# Patient Record
Sex: FEMALE | Race: WHITE | NOT HISPANIC OR LATINO | Employment: OTHER | URBAN - METROPOLITAN AREA
[De-identification: names, ages, dates, MRNs, and addresses within clinical notes are randomized per-mention and may not be internally consistent; named-entity substitution may affect disease eponyms.]

---

## 2021-01-14 DIAGNOSIS — Z23 NEED FOR VACCINATION: ICD-10-CM

## 2022-01-28 ENCOUNTER — APPOINTMENT (OUTPATIENT)
Dept: RADIOLOGY | Facility: REHABILITATION | Age: 77
End: 2022-01-28
Attending: PHYSICAL MEDICINE & REHABILITATION
Payer: MEDICARE

## 2022-01-28 ENCOUNTER — HOSPITAL ENCOUNTER (OUTPATIENT)
Facility: REHABILITATION | Age: 77
End: 2022-01-28
Attending: PHYSICAL MEDICINE & REHABILITATION | Admitting: PHYSICAL MEDICINE & REHABILITATION
Payer: MEDICARE

## 2022-01-28 VITALS
SYSTOLIC BLOOD PRESSURE: 153 MMHG | TEMPERATURE: 97.7 F | OXYGEN SATURATION: 99 % | WEIGHT: 112.43 LBS | HEIGHT: 60 IN | DIASTOLIC BLOOD PRESSURE: 91 MMHG | HEART RATE: 96 BPM | RESPIRATION RATE: 16 BRPM | BODY MASS INDEX: 22.07 KG/M2

## 2022-01-28 PROCEDURE — 700101 HCHG RX REV CODE 250

## 2022-01-28 PROCEDURE — 62323 NJX INTERLAMINAR LMBR/SAC: CPT

## 2022-01-28 PROCEDURE — 64483 NJX AA&/STRD TFRM EPI L/S 1: CPT

## 2022-01-28 PROCEDURE — 700111 HCHG RX REV CODE 636 W/ 250 OVERRIDE (IP)

## 2022-01-28 PROCEDURE — 700117 HCHG RX CONTRAST REV CODE 255

## 2022-01-28 RX ORDER — ATORVASTATIN CALCIUM 20 MG/1
80 TABLET, FILM COATED ORAL NIGHTLY
COMMUNITY

## 2022-01-28 RX ORDER — NITROGLYCERIN 0.4 MG/1
0.4 TABLET SUBLINGUAL
COMMUNITY

## 2022-01-28 RX ORDER — LISINOPRIL 5 MG/1
2.5 TABLET ORAL DAILY
COMMUNITY

## 2022-01-28 RX ORDER — METHYLPREDNISOLONE ACETATE 80 MG/ML
INJECTION, SUSPENSION INTRA-ARTICULAR; INTRALESIONAL; INTRAMUSCULAR; SOFT TISSUE
Status: COMPLETED
Start: 2022-01-28 | End: 2022-01-28

## 2022-01-28 RX ORDER — DILTIAZEM HYDROCHLORIDE 120 MG/1
120 CAPSULE, COATED, EXTENDED RELEASE ORAL DAILY
COMMUNITY

## 2022-01-28 RX ORDER — DEXAMETHASONE SODIUM PHOSPHATE 10 MG/ML
INJECTION, SOLUTION INTRAMUSCULAR; INTRAVENOUS
Status: COMPLETED
Start: 2022-01-28 | End: 2022-01-28

## 2022-01-28 RX ORDER — ONDANSETRON 2 MG/ML
4 INJECTION INTRAMUSCULAR; INTRAVENOUS
Status: DISCONTINUED | OUTPATIENT
Start: 2022-01-28 | End: 2022-01-28 | Stop reason: HOSPADM

## 2022-01-28 RX ORDER — LIDOCAINE HYDROCHLORIDE 20 MG/ML
INJECTION, SOLUTION EPIDURAL; INFILTRATION; INTRACAUDAL; PERINEURAL
Status: COMPLETED
Start: 2022-01-28 | End: 2022-01-28

## 2022-01-28 RX ORDER — ISOSORBIDE MONONITRATE 30 MG/1
30 TABLET, EXTENDED RELEASE ORAL EVERY MORNING
COMMUNITY

## 2022-01-28 RX ADMIN — LIDOCAINE HYDROCHLORIDE 5 ML: 20 INJECTION, SOLUTION EPIDURAL; INFILTRATION; INTRACAUDAL; PERINEURAL at 11:36

## 2022-01-28 RX ADMIN — IOHEXOL 3 ML: 240 INJECTION, SOLUTION INTRATHECAL; INTRAVASCULAR; INTRAVENOUS; ORAL at 11:37

## 2022-01-28 RX ADMIN — DEXAMETHASONE SODIUM PHOSPHATE 20 MG: 10 INJECTION, SOLUTION INTRAMUSCULAR; INTRAVENOUS at 11:38

## 2022-01-28 ASSESSMENT — PAIN DESCRIPTION - PAIN TYPE
TYPE: CHRONIC PAIN

## 2022-01-28 NOTE — PROGRESS NOTES
Pt admitted to pre-procedure room. Site and procedure confirmed. Pt history, medications and allergies reviewed. Designated  waiting in car. Pre-procedure assessment complete. VSS. Consents signed, DC instructions reviewed, all questions answered. Pertinent medical history (HTN, Kidney Donor) reviewed in Epic and communicated to procedure RN.  Dr. Syed in to see patient. Orders noted.

## 2022-01-28 NOTE — PROGRESS NOTES
1122: Hand-off rec'd from pre-procedure RN, pre-assessment completed, pt placed onto procedure table and positioned prone and hands resting on stool, blood pressure and pulse ox connected by CNA and RN, medical history reviewed (HTN, kidney donor), allergies reviewed (NKDA), no sedation given, not taking blood thinners, patient identified, time out performed and team agreed.

## 2022-01-28 NOTE — H&P
Physical Medicine & Rehab History & Physical Note    Date  1/28/2022      Pre-Op Diagnosis Codes:     * Low back pain, unspecified back pain laterality, unspecified chronicity, unspecified whether sciatica present [M54.50]    HPI  This is a 77 y.o. female who presented with LBP and RLE pain to the ankle since 10/21.  Two prior lumbar surgeries.  Pain today 6/10.    Past Medical History:   Diagnosis Date   • Hypertension    • Kidney donor        Past Surgical History:   Procedure Laterality Date   • ESOPHAGEAL MOTILITY OR MANOMETRY N/A 4/27/2016    Procedure: ESOPHAGEAL MOTILITY OR MANOMETRY;  Surgeon: Morales Starkey M.D.;  Location: ENDOSCOPY [facility];  Service:    • OTHER  1976    Kidney donor    • GYN SURGERY      hysterectomy   • OTHER ORTHOPEDIC SURGERY         Current Facility-Administered Medications   Medication Dose Route Frequency Provider Last Rate Last Admin   • ondansetron (ZOFRAN) syringe/vial injection 4 mg  4 mg Intravenous Once PRN Julio Syed M.D.           Social History     Socioeconomic History   • Marital status:      Spouse name: Not on file   • Number of children: Not on file   • Years of education: Not on file   • Highest education level: Not on file   Occupational History   • Not on file   Tobacco Use   • Smoking status: Former Smoker   • Smokeless tobacco: Former User     Quit date: 2/2/2015   Substance and Sexual Activity   • Alcohol use: Yes     Comment: 3 drinks per week    • Drug use: No   • Sexual activity: Not on file   Other Topics Concern   • Not on file   Social History Narrative   • Not on file     Social Determinants of Health     Financial Resource Strain:    • Difficulty of Paying Living Expenses: Not on file   Food Insecurity:    • Worried About Running Out of Food in the Last Year: Not on file   • Ran Out of Food in the Last Year: Not on file   Transportation Needs:    • Lack of Transportation (Medical): Not on file   • Lack of Transportation (Non-Medical):  Not on file   Physical Activity:    • Days of Exercise per Week: Not on file   • Minutes of Exercise per Session: Not on file   Stress:    • Feeling of Stress : Not on file   Social Connections:    • Frequency of Communication with Friends and Family: Not on file   • Frequency of Social Gatherings with Friends and Family: Not on file   • Attends [employer] Services: Not on file   • Active Member of Clubs or Organizations: Not on file   • Attends Club or Organization Meetings: Not on file   • Marital Status: Not on file   Intimate Partner Violence:    • Fear of Current or Ex-Partner: Not on file   • Emotionally Abused: Not on file   • Physically Abused: Not on file   • Sexually Abused: Not on file   Housing Stability:    • Unable to Pay for Housing in the Last Year: Not on file   • Number of Places Lived in the Last Year: Not on file   • Unstable Housing in the Last Year: Not on file       History reviewed. No pertinent family history.    Allergies  Patient has no known allergies.    Review of Systems  No B/B problems, no leg N/T.    Physical Exam    Vital Signs  Blood Pressure : 153/91   Temperature: 36.5 °C (97.7 °F)   Pulse: 96   Respiration: 16   Pulse Oximetry: 99 %       MRI - L4-5 extruded disc w/ sequestered fragment   Radiology:  DX-PORTABLE FLUOROSCOPY < 1 HOUR   Preliminary Result      Portable fluoroscopy utilized for 16.5 seconds.         INTERPRETING LOCATION: [address] [place] [address] [place], [id number]            Assessment/Plan:  76 yo female with LBP, RLE pain to ankle.  R L4 radiculopathy.  - R L4, and if needed L5 TFE.

## 2022-01-28 NOTE — PROGRESS NOTES
Pt arrived from procedure room, report received from procedure RN. VSS, tolerating liquids with no nausea, pain assessed. Dressing CDI. Dr. Syed in to see patient. Pt ambulatory and meets DC criteria    Pt DC'd to designated

## 2022-01-28 NOTE — OR SURGEON
Immediate Post OP Note    Pre-Op Diagnosis Codes:     * Low back pain, unspecified back pain laterality, unspecified chronicity, unspecified whether sciatica present [M54.50]      Post-Op Diagnosis Codes:     * Low back pain, unspecified back pain laterality, unspecified chronicity, unspecified whether sciatica present [M54.50]      Procedure(s):  Right L4-5 transforaminal epidural injection - Wound Class: Clean    Surgeon(s):  Julio Syed M.D.    Anesthesiologist/Type of Anesthesia:  No anesthesia staff entered./Local    Surgical Staff:  Circulator: Yuki Joseph R.N.  Scrub Person: Anmol Figueroa C.N.A.  Radiology Technologist: Cristobal Pichardo    Specimens removed if any:  * No specimens in log *    Estimated Blood Loss: None    Findings: None    Complications: None        1/28/2022 11:59 AM Julio Syed M.D.

## 2022-01-28 NOTE — PROCEDURES
DATE OF PROCEDURE:  01/28/2022     PROCEDURES PERFORMED:  1.  Right L4-L5 transforaminal epidural steroid injection with 10 mg of   Decadron under fluoroscopic guidance.  2.  Permanent images were recorded.     INDICATIONS:  The patient is a patient of [facility] with   low back pain traveling down the right leg to the ankle, felt to be related   to a right L4 radiculopathy due to an extruded disk.     DESCRIPTION OF PROCEDURE:  After appropriate informed consent was obtained,   she was placed prone on the table.  Her skin was thoroughly cleansed with   Betadine swabs x3.  Following this, the subcutaneous, intramuscular, and   interligamentous region was anesthetized with lidocaine.     A 3-1/2 inch 22-gauge spinal needle was directed under intermittent   fluoroscopic guidance to the opening of the right L4 neural foramen.  A   lateral view was obtained, which showed the needle tip to be in the superior   one-half and posterior one-third of the neural foramen.  An AP view was then   obtained, which showed the needle tip to be approximately below the 6 o'clock   position of the pedicle.     EPIDUROGRAM:  1 mL of Omnipaque was placed through the opening of the right L4   neural foramen and proved to be a transforaminal dye spread pattern with dye   wrapping under the pedicle and into the anterior epidural space.  She had an   exact replicate of her typical symptoms with contrast administration.     I slowly placed 10 mg of Decadron along with 0.5 mL of 2% lidocaine in   approximately 3-4 separate increments as she was having a reproduction of her   typical symptomatology with placement of the medication.     Overall, she tolerated the procedure well without procedure complications.     She was referred to the recovery area and I saw her there.     She had a 6/10 pain before the procedure.     Post-procedure, she did not have any pain sitting.  We had her get up and walk   around and she was  reporting 4/10 pain in the anterior thigh region.     She certainly had a great diagnostic response to the procedure with   reproduction of her pain, with about a 33% reduction in her pain overall   post-procedure.     She plans to follow up with you in approximately 2 weeks to monitor response   to the injection.  I let her know if she has any complications or troubles   with the injection to contact me.  She demonstrated understanding.     Thank you ____ for referring the patient for the epidural injection.  If you   have any questions or concerns, please do not hesitate to contact me.        ______________________________  MD CHERELLE Wakefield/ELAINE/REBECCA    DD:  01/28/2022 12:14  DT:  01/28/2022 12:32    Job#:  [id number]

## 2022-01-29 NOTE — H&P
DATE OF SERVICE:  01/28/2022     Dear Rudy,     Thank you for referring the patient.  I really appreciate it.  She is a   pleasant patient.     As you know, she is a 77-year-old female who has had pain in the low back and   traveling down the right lower extremity to the ankle since 10/2021.     She reports that she had a prior low back surgery in 2016 and one that also   preceeded this about 20 years or more previously.     She had done well following the 2016 surgery up until 10/2021.     She reports her pain level this morning is better than it has been, including   a 6/10 pain level.  She denies any numbness and tingling in the right leg.  No   bowel or bladder problems.  She uses Tylenol typically for pain.     DRUG ALLERGIES:  No known drug allergies.     CURRENT MEDICATIONS:  Lisinopril, atorvastatin, diltiazem, isosorbide   mononitrate.     PAST MEDICAL HISTORY:  Hypertension, hyperlipidemia, coronary artery spasm.     PAST SURGICAL HISTORY:  Cervical surgery, lumbar surgery x2, hysterectomy, one   kidney donated, remaining kidney is healthy per her report.     SOCIAL HISTORY:  , has two children.  Does not use tobacco.  Drinks 3   alcoholic drinks per day.  Retired.     REVIEW OF SYSTEMS:  Denies any lower extremity numbness or tingling or bowel   or bladder problems.  No left lower extremity symptoms.  Positive for low back   pain and right lower extremity pain to the ankle.     IMAGING REVIEW: MRI [facility] the lumbar spine [facility]   dated 11/18/2021 shows postsurgical changes of the pedicle screw and ale   fixation from L5-S1.  L1-2 disk desiccation.  Facet arthropathy.  L2-3 disk   space narrowing with a protrusion.  Moderate central canal stenosis.  No   neural foraminal narrowing.  L3-4 disk protrusion.  Mild central canal   stenosis.  Left neural foraminal stenosis.  L4-5 right extruded disk with mild   central canal stenosis.     PHYSICAL EXAMINATION:   VITAL SIGNS:   Blood pressure 142/69, pulse rate 107, respiratory rate 15,   temperature 97.7 degrees Fahrenheit, 94% on room air.  NEUROLOGIC:  Reveals 5-/5 strength for hip flexion and knee extension on the   right.  Strength is otherwise felt to be 5/5 throughout the bilateral lower   extremities.  Sensation is felt to be equal and symmetrical bilaterally.     Reflexes are felt to be symmetrical at the knees and ankles bilaterally.     There is no distal lower extremity edema.     IMPRESSION:  1.  The patient is a pleasant 77-year-old female with low back pain and pain   radiating down into the right lower extremity to the ankle since 10/2021.  2.  Right L4 radiculopathy is felt to be the likely cause of symptoms.  She does have symptoms traveling down to the   ankle consistent with the L4 nerve root as well as some weakness in hip   flexion and knee extension on examination today, consistent with the L4   myotome.  2.  History of prior lumbar surgery, fusion at L5-S1.     RECOMMENDATIONS:  I agree with your recommendation for a right L4-5 epidural.    Given the prior history of lumbar surgery, we will do a transforaminal   approach at the right L4, and if needed, if she does not have an exact replica   of her typical symptoms, an L5 level transforaminal epidural injection as   well.     Thank you Rene for referring the patient.  I appreciate it.  Please do not   hesitate to contact me if you have any questions at [phone].        ______________________________  MD CHERELLE Wakefield/EVA/MICHELE/MICHELE    DD:  01/28/2022 11:59  DT:  01/28/2022 12:55    Job#:  [id number]    CC:RENE MURRELL MD

## 2024-02-23 ENCOUNTER — HOSPITAL ENCOUNTER (OUTPATIENT)
Dept: RADIOLOGY | Facility: MEDICAL CENTER | Age: 79
End: 2024-02-23
Attending: PHYSICAL MEDICINE & REHABILITATION
Payer: MEDICARE

## 2024-02-23 DIAGNOSIS — M54.12 BRACHIAL NEURITIS: ICD-10-CM

## 2024-02-23 PROCEDURE — 72141 MRI NECK SPINE W/O DYE: CPT
